# Patient Record
Sex: MALE | Race: WHITE | NOT HISPANIC OR LATINO | Employment: STUDENT | URBAN - METROPOLITAN AREA
[De-identification: names, ages, dates, MRNs, and addresses within clinical notes are randomized per-mention and may not be internally consistent; named-entity substitution may affect disease eponyms.]

---

## 2021-08-22 ENCOUNTER — HOSPITAL ENCOUNTER (EMERGENCY)
Facility: HOSPITAL | Age: 21
Discharge: HOME | End: 2021-08-22
Attending: EMERGENCY MEDICINE
Payer: MEDICAID

## 2021-08-22 VITALS
DIASTOLIC BLOOD PRESSURE: 69 MMHG | OXYGEN SATURATION: 99 % | HEIGHT: 74 IN | SYSTOLIC BLOOD PRESSURE: 118 MMHG | WEIGHT: 280 LBS | RESPIRATION RATE: 22 BRPM | HEART RATE: 62 BPM | BODY MASS INDEX: 35.94 KG/M2 | TEMPERATURE: 100 F

## 2021-08-22 DIAGNOSIS — T14.8XXA ABRASION: ICD-10-CM
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
## 2021-08-22 DIAGNOSIS — F41.9 ANXIETY: ICD-10-CM

## 2021-08-22 LAB — CK SERPL-CCNC: 761 U/L (ref 20–200)

## 2021-08-22 PROCEDURE — 99203 PR OFFICE/OUTPT VISIT, NEW, LEVL III, 30-44 MIN: ICD-10-PCS | Mod: 95,AF,HA, | Performed by: PSYCHIATRY & NEUROLOGY

## 2021-08-22 PROCEDURE — 99203 OFFICE O/P NEW LOW 30 MIN: CPT | Mod: 95,AF,HA, | Performed by: PSYCHIATRY & NEUROLOGY

## 2021-08-22 PROCEDURE — 99285 EMERGENCY DEPT VISIT HI MDM: CPT | Mod: 25,ER

## 2021-08-22 PROCEDURE — 82550 ASSAY OF CK (CPK): CPT | Performed by: EMERGENCY MEDICINE

## 2021-12-22 ENCOUNTER — LAB VISIT (OUTPATIENT)
Dept: PRIMARY CARE CLINIC | Facility: OTHER | Age: 21
End: 2021-12-22
Attending: INTERNAL MEDICINE
Payer: MEDICAID

## 2021-12-22 DIAGNOSIS — Z20.822 ENCOUNTER FOR LABORATORY TESTING FOR COVID-19 VIRUS: ICD-10-CM

## 2021-12-22 PROCEDURE — U0003 INFECTIOUS AGENT DETECTION BY NUCLEIC ACID (DNA OR RNA); SEVERE ACUTE RESPIRATORY SYNDROME CORONAVIRUS 2 (SARS-COV-2) (CORONAVIRUS DISEASE [COVID-19]), AMPLIFIED PROBE TECHNIQUE, MAKING USE OF HIGH THROUGHPUT TECHNOLOGIES AS DESCRIBED BY CMS-2020-01-R: HCPCS | Performed by: INTERNAL MEDICINE

## 2021-12-23 LAB
SARS-COV-2 RNA RESP QL NAA+PROBE: NOT DETECTED
SARS-COV-2- CYCLE NUMBER: NORMAL

## 2022-05-12 ENCOUNTER — HOSPITAL ENCOUNTER (EMERGENCY)
Facility: HOSPITAL | Age: 22
Discharge: HOME | End: 2022-05-12
Attending: EMERGENCY MEDICINE
Payer: MEDICAID

## 2022-05-12 VITALS
OXYGEN SATURATION: 99 % | BODY MASS INDEX: 28.34 KG/M2 | HEART RATE: 84 BPM | WEIGHT: 240 LBS | DIASTOLIC BLOOD PRESSURE: 71 MMHG | HEIGHT: 77 IN | TEMPERATURE: 99 F | RESPIRATION RATE: 18 BRPM | SYSTOLIC BLOOD PRESSURE: 135 MMHG

## 2022-05-12 DIAGNOSIS — S99.929A TRAUMATIC INJURY OF FOOT: Primary | ICD-10-CM

## 2022-05-12 PROCEDURE — 99284 EMERGENCY DEPT VISIT MOD MDM: CPT | Mod: 25

## 2022-05-12 NOTE — Clinical Note
"Wyatt Mckeon"Oj was seen and treated in our emergency department on 5/12/2022.  He may return to work on 05/16/2022.       If you have any questions or concerns, please don't hesitate to call.      Reno Schuster PA-C"

## 2022-05-13 NOTE — DISCHARGE INSTRUCTIONS
Rest, ice, compression, elevation (RICE) of your feet for the next few days.  Weight-bearing as tolerated.  Tylenol or ibuprofen as needed for pain.  Follow-up with orthopedic physician should experience persistent pain.    Return to this emergency department if worsening pain, if unable to walk or bear weight in the next 2-3 days, if feet become cold or discolored, if any other problems occur.

## 2022-05-13 NOTE — ED PROVIDER NOTES
Encounter Date: 5/12/2022       History     Chief Complaint   Patient presents with    Foot Injury     Pt reports dropping a 3-ton floor herlinda on left foot. Pain and minor swelling noted. Pain with weight bearing.      21-year-old male with chief complaint bilateral foot pain, left ankle pain after injury prior to arrival.    Pt was picking up a heavy vehicle herlinda; lifted the object to approx tib/fib region and accidentally dropped onto bilateral feet, mostly to L foot.  Able to tolerate weight-bearing on the right lower extremity, painful, limited weight-bearing to left lower extremity.  No history of surgery to bilateral feet.  No open wounds.  He admits to numbness to all of his left toes.  Admits to limited ankle range of motion.  Denies ankle or lower extremity trauma other than to the foot.  Symptoms are acute, constant, mild to moderate.  Pain is alleviated with rest, elevation.  Pain radiates from his left dorsal foot into his left ankle.        Review of patient's allergies indicates:  No Known Allergies  History reviewed. No pertinent past medical history.  Past Surgical History:   Procedure Laterality Date    TONSILLECTOMY       History reviewed. No pertinent family history.  Social History     Tobacco Use    Smoking status: Never Smoker   Substance Use Topics    Alcohol use: No    Drug use: No     Review of Systems   Musculoskeletal: Positive for arthralgias, gait problem and joint swelling.   Skin: Negative for wound.   Neurological: Positive for numbness. Negative for weakness.       Physical Exam     Initial Vitals   BP Pulse Resp Temp SpO2   05/12/22 1947 05/12/22 1946 05/12/22 1946 05/12/22 1946 05/12/22 1946   (!) 148/67 82 16 98 °F (36.7 °C) 98 %      MAP       --                Physical Exam    Nursing note and vitals reviewed.  Constitutional: He appears well-developed and well-nourished. He is not diaphoretic. No distress.   HENT:   Head: Normocephalic and atraumatic.   Neck: Neck supple.    Cardiovascular: Intact distal pulses.   2+DP bilaterally   Musculoskeletal:      Cervical back: Neck supple.      Comments: R foot: ttp distal aspect 2nd metatarsal. Full, somewhat painful ROM all R toes. No bony deformity. No open wound.  Full active range of motion of the ankle without discomfort or difficulty. 2s cap refill all toes    L foot: ttp 1st-3rd distal metatarsal. Limited active ROM involved toes. Limited active ROM L ankle, painful passive ROM with plantarflexion, inversion/everion. No achilles ttp or defect. No bony deformity. Superficial abrasion overlying 1st metatarsal. 2s cap refill all toes.     Neurological: He is alert and oriented to person, place, and time. GCS score is 15. GCS eye subscore is 4. GCS verbal subscore is 5. GCS motor subscore is 6.   Bilateral feet sensate   Skin: Skin is warm. Capillary refill takes less than 2 seconds.   Psychiatric: He has a normal mood and affect. Thought content normal.         ED Course   Procedures  Labs Reviewed - No data to display       Imaging Results          X-Ray Tibia Fibula 2 View Left (Final result)  Result time 05/12/22 20:29:29    Final result by Azucena Juares MD (05/12/22 20:29:29)                 Impression:      No acute bony abnormality detected.      Electronically signed by: Azucena Juares  Date:    05/12/2022  Time:    20:29             Narrative:    EXAMINATION:  TWO VIEWS OF THE LEFT TIBIA AND FIBULA    CLINICAL HISTORY:  Pain.    TECHNIQUE:  AP and lateral views of the left tibia and fibula    COMPARISON:  None.    FINDINGS:  Two views of the left tibia and fibula demonstrate no acute fracture or dislocation.                               X-Ray Foot Complete Bilateral (Final result)  Result time 05/12/22 20:17:41   Procedure changed from X-Ray Foot Complete Right     Final result by Azucena Juares MD (05/12/22 20:17:41)                 Impression:      No acute bony abnormality detected.      Electronically signed  by: Azucena Juares  Date:    05/12/2022  Time:    20:17             Narrative:    EXAMINATION:  XR ANKLE THREE VIEWS BILATERAL AND XR FOOT COMPLETE THREE VIEWS BILATERAL    CLINICAL HISTORY:  Injury, unspecified, initial encountertrauma;    TECHNIQUE:  AP, oblique, and lateral view of the both feet and ankles    COMPARISON:  None.    FINDINGS:  Three views of the right ankle demonstrate no acute fracture or dislocation.    Three views of the left ankle demonstrate no acute fracture or dislocation.    Three views of the right foot demonstrate no acute fracture or dislocation.    Three views of the left foot demonstrate no acute fracture or dislocation.                               X-Ray Ankle Complete Bilateral (Final result)  Result time 05/12/22 20:17:41   Procedure changed from X-Ray Ankle Complete Right     Final result by Azucena Juares MD (05/12/22 20:17:41)                 Impression:      No acute bony abnormality detected.      Electronically signed by: Azucena Juares  Date:    05/12/2022  Time:    20:17             Narrative:    EXAMINATION:  XR ANKLE THREE VIEWS BILATERAL AND XR FOOT COMPLETE THREE VIEWS BILATERAL    CLINICAL HISTORY:  Injury, unspecified, initial encountertrauma;    TECHNIQUE:  AP, oblique, and lateral view of the both feet and ankles    COMPARISON:  None.    FINDINGS:  Three views of the right ankle demonstrate no acute fracture or dislocation.    Three views of the left ankle demonstrate no acute fracture or dislocation.    Three views of the right foot demonstrate no acute fracture or dislocation.    Three views of the left foot demonstrate no acute fracture or dislocation.                                 Medications - No data to display  Medical Decision Making:   Differential Diagnosis:   Contusion, occult fracture, foot fracture  Clinical Tests:   Radiological Study: Ordered and Reviewed  ED Management:  No acute fracture on imaging.  He is able to tolerate weight-bearing on  the left foot although with discomfort.  No evidence of open fracture.  Bilateral feet are neurovascularly intact.  Possible hairline or occult fracture, advised follow-up with ortho for any persistent pain.  RICE precautions, weight-bearing as tolerated, return precautions given.                      Clinical Impression:   Final diagnoses:  [G88.967B] Traumatic injury of foot (Primary)          ED Disposition Condition    Discharge Stable        ED Prescriptions     None        Follow-up Information     Follow up With Specialties Details Why Contact [facility]  Schedule an appointment as soon as possible for a visit   [address] [place] [id number]    [phone]    Génesis Raymond MD Orthopedic Surgery Schedule an appointment as soon as possible for a visit  For reevaluation [address] [facility] [address]  [phone]             Reno Schuster PA-C  05/12/22 [id number]

## 2022-05-13 NOTE — ED TRIAGE NOTES
Pt came via triage CC left injury from accidentally dropping a 3 ton floor herlinda on it. CC pain on weight bearing